# Patient Record
(demographics unavailable — no encounter records)

---

## 2025-01-24 NOTE — ASSESSMENT
[FreeTextEntry1] : DNS and Rhinitis Rx   Steam humidification and hypertonic saline nasal irrigations  and d/c Zyrtec for now poss CT scan of PNS consider SMR  Tinnitus poss2/2 ETD

## 2025-01-24 NOTE — REASON FOR VISIT
[Initial Evaluation] : an initial evaluation for [Tinnitus] : tinnitus [Nasal Obstruction] : nasal obstruction

## 2025-01-24 NOTE — PHYSICAL EXAM
[Nasal Endoscopy Performed] : nasal endoscopy was performed, see procedure section for findings [] : septum deviated to the right [Midline] : trachea located in midline position [Normal] : no rashes [de-identified] : congested

## 2025-01-24 NOTE — PROCEDURE
[de-identified] : Patrizia [de-identified] :  Anterior Rhinoscopy insufficient to account for symptoms.  After informed verbal consent is obtained, the fiberoptic nasal endoscope #3 is passed via the right nasal cavity. The following anatomic sites were directly examined in a sequential fashion: The scope was introduced in both  nasal passages between the middle and inferior turbinates to exam the inferior portion of the middle meatus and the fontanelle, as well as the maxillary ostia.  Next, the scope was passed medially and posteriorly to the middle turbinates to examine the sphenoethmoid recess and the superior turbinate region.   There is [ 0 ]% obstruction of the nasopharynx with adenoid tissue.  A RIGHT deviated nasal septum was noted causing obstruction. The turbinates were congested-bilateral.  Right Side: 	Mucosa: wnl 	Mucous: none 	Polyp: none 	Inferior Turbinate: sl congested 	Middle Turbinate:sl congested 	Superior Turbinate: wnl 	Inferior Meatus:clear 	Middle Meatus: clear 	Super Meatus: clear 	Sphenoethmoidal Recess: wnl    Left Side: 	Mucosa: wnl 	Mucous:none 	Polyp: none 	Inferior Turbinate: sl congested 	Middle Turbinate: sl congested 	Superior Turbinate:wnl 	Inferior Meatus: clear 	Middle Meatus: clear 	Super Meatus:clear 	Sphenoethmoidal Recess: wnl

## 2025-01-24 NOTE — HISTORY OF PRESENT ILLNESS
[de-identified] : 60 yo Chr nasal congestion sl helped w/ Zyrtec Now w/right tinntus w/poss HL w/o vertigo no other modifying factors no other nasal or throat complaints

## 2025-01-24 NOTE — DATA REVIEWED
[de-identified] : Hearing Test performed to evaluate the extent of hearing loss and  to explain pt's symptoms  was personally reviewed and revealed Tymps-wnl Hearing-wnl

## 2025-02-13 NOTE — ASSESSMENT
[FreeTextEntry1] : hair loss TE and AGA education discussed topical minoxidil, PO minoxidil, and PRP; SED  PO minoxidil 2.5 mg daily; SED  f/u 6 months

## 2025-02-13 NOTE — HISTORY OF PRESENT ILLNESS
Health Maintenance Due   Topic Date Due   â¢ COVID-19 Vaccine (1) Never done   â¢ Shingles Vaccine (1 of 2) Never done   â¢ Hepatitis C Screening  Never done   â¢ DTaP/Tdap/Td Vaccine (1 - Tdap) 07/01/2015   â¢ Colonoscopy Risk  12/17/2018     HM topics deferred to PCP at [place]. [FreeTextEntry1] : hair loss [de-identified] : hair loss. had gastric sleeve surgery.

## 2025-02-23 NOTE — HISTORY OF PRESENT ILLNESS
[FreeTextEntry1] : CPE [de-identified] : Pt reports feeling well today  Pt reports being under GYN specialist and hormonal therapy.

## 2025-02-23 NOTE — HEALTH RISK ASSESSMENT
[Good] : ~his/her~  mood as  good [No] : No [No falls in past year] : Patient reported no falls in the past year [0] : 2) Feeling down, depressed, or hopeless: Not at all (0) [Never] : Never [de-identified] : walking [de-identified] : heathy  [[id number]] : 0

## 2025-02-23 NOTE — HEALTH RISK ASSESSMENT
[Good] : ~his/her~  mood as  good [No] : No [No falls in past year] : Patient reported no falls in the past year [0] : 2) Feeling down, depressed, or hopeless: Not at all (0) [Never] : Never [de-identified] : walking [de-identified] : heathy  [[id number]] : 0

## 2025-02-23 NOTE — HISTORY OF PRESENT ILLNESS
[FreeTextEntry1] : CPE [de-identified] : Pt reports feeling well today  Pt reports being under GYN specialist and hormonal therapy.

## 2025-03-11 NOTE — PROCEDURE
[FreeTextEntry1] : left stab phlebectomy   [FreeTextEntry3] : Procedural safety checklist and time out completed: Confirmed patient identification (Patient Name, , and/or medical record number including when possible affirmation by patient or parent/family/other. Confirmed procedure with the patient. Consent present, accurate and signed.  Confirmed special equipment and supplies are present. Sterility confirmed. Position verified.  Site/ side is marked and visible and confirmed.  Procedure confirmed by consent. Accurate consent including side and site. Review of medical records noting correct procedure including site and side. MD/PA verifies presence and review of imaging studies and or written report of imaging studies. Specify equipment are available for the planned procedure. MD/PA has marked the patient's procedural site and side. Agreement on the procedure to be performed Time out completed. All of the above has been confirmed by the team. All patient-specific concerns have been addressed.   Indication:  left lower extremity varicose veins with inflammation, leg pain, leg swelling, and leg cramping.    Procedure: Stab phlebectomy left lower extremity   Ms. NIKHIL MIMS is a 61 year old F with a history of symptomatic left lower extremity varicose veins. A trial of compression stockings, exercise, elevation, and pain medication was attempted without relief and definitive treatment with microphlebectomy was offered.   I have discussed the risks of the procedure at length with the patient. The risks discussed were inclusive of but not limited to infection, irritation at the site of infiltration of local anesthesia, and also rare risk of deep venous thrombosis and pulmonary emboli. The patient agrees to proceed with the procedure.  The patient was escorted into the procedure room, the varicose veins for treatment were marked out and the patient placed on the examination table. The entire limb was prepped and draped in sterile fashion and a  time out was called.   Local anesthesia using __ cc 1% lidocaine was infiltrated using a 25 gauge needle over the previously marked prominent varicose vein sites. Multiple small stab incisions, each less than 1 cm in length was made at the noted sites. With the help of a vein hook, the vein was fished out at each of these sites, rolled over a narrow-tipped mosquito clamp and removed. Hemostasis was secured with leg elevation and application of manual pressure. After assuring hemostasis, a sterile 4x4 was placed on the access sites and an ACE compression wrap was applied. Estimated blood loss: minimal. Patient tolerated procedure well. Patient was given post-procedure instructions and follow up appointment was scheduled.   SIDE - LEFT	 SITE - CALF / THIGH LOCATION - MEDIAL / LATERAL / ANTERIOR / POSTERIOR	 Total Stab Incisions __